# Patient Record
Sex: MALE | Race: WHITE | Employment: OTHER | URBAN - METROPOLITAN AREA
[De-identification: names, ages, dates, MRNs, and addresses within clinical notes are randomized per-mention and may not be internally consistent; named-entity substitution may affect disease eponyms.]

---

## 2019-08-01 ENCOUNTER — HOSPITAL ENCOUNTER (EMERGENCY)
Facility: CLINIC | Age: 49
Discharge: HOME | End: 2019-08-01
Attending: NURSE PRACTITIONER | Admitting: NURSE PRACTITIONER

## 2019-08-01 VITALS
RESPIRATION RATE: 18 BRPM | DIASTOLIC BLOOD PRESSURE: 82 MMHG | TEMPERATURE: 97.7 F | WEIGHT: 170 LBS | BODY MASS INDEX: 21.82 KG/M2 | OXYGEN SATURATION: 96 % | SYSTOLIC BLOOD PRESSURE: 120 MMHG | HEIGHT: 74 IN

## 2019-08-01 DIAGNOSIS — T40.1X1A: ICD-10-CM

## 2019-08-01 DIAGNOSIS — F11.20 HEROIN ADDICTION (H): ICD-10-CM

## 2019-08-01 PROCEDURE — 99283 EMERGENCY DEPT VISIT LOW MDM: CPT

## 2019-08-01 ASSESSMENT — ENCOUNTER SYMPTOMS
FEVER: 0
SHORTNESS OF BREATH: 1

## 2019-08-01 ASSESSMENT — MIFFLIN-ST. JEOR: SCORE: 1705.86

## 2019-08-01 NOTE — ED PROVIDER NOTES
"  History     Chief Complaint:  Drug Overdose    HPI   Agustín Gomez is a 48 year old male who presents via EMS for a drug overdose. The patient reports that he injected heroin into his leg at around 0930 this morning. The patient's friends saw him inject the heroin and watched him become unconscious and called EMS. EMS arrived and gave the patient 2mg  narcan around 20 minutes prior to arrival here. The patient states that he was feeling fine and did not want to come in however he began feeling tired and he then wanted to come in to be monitored. The patient states that he has been using heroin since 2005 and prior to today the last time he used was a few days ago. The patient has no interest in treatment. The patient was getting nebulization on arrival from EMS, he has COPD.     Allergies:  No known drug allergies.    Medications:    The patient is not currently taking any prescribed medications.    Past Medical History:    COPD    Past Surgical History:    History reviewed. No pertinent past surgical history.    Family History:    History reviewed. No pertinent family history.    Social History:  Presents to ED via EMS  PCP: No primary care provider on file.     Review of Systems   Constitutional: Negative for fever.   Respiratory: Positive for shortness of breath.    All other systems reviewed and are negative.    Physical Exam   First Vitals:  Patient Vitals for the past 24 hrs:   BP Temp Temp src Heart Rate Resp SpO2 Height Weight   08/01/19 1139 -- -- -- -- -- 96 % -- --   08/01/19 1045 -- -- -- -- -- 93 % -- --   08/01/19 1030 -- -- -- -- -- 91 % -- --   08/01/19 1016 120/82 97.7  F (36.5  C) Oral 99 18 97 % 1.88 m (6' 2\") 77.1 kg (170 lb)     Physical Exam  Physical Exam   Constitutional:  Awake, alert, and able to maintain conversation.   Head: Head moves freely with normal range of motion.   ENT: Oropharynx is clear and moist. Several missing teeth and overall teeth in poor repair.  Eyes: Conjunctivae " pink. EOMs intact.   Neck: Normal range of motion.   Cardiovascular: Regular rate and rhythm. Normal heart sounds. No concerning murmur. Intact distal pulses: radial pulses 2+ on the right, 2+ on the left.   Pulmonary/Chest: No decreased respirations. No respiratory distress. No decreased breath sounds. No wheezes. No rhonchi. No rales. Lungs clear throughout.   Abdominal: Soft. Non-tender. No rebound, no guarding.   Musculoskeletal: No peripheral edema. Distal capillary refill and sensation intact.   Neurological: Oriented to person, place, and time. No focal deficits.   Skin: Skin is warm and normal in color. No rash noted. Several scars over arms and legs likely from skin picking.    Emergency Department Course     Emergency Department Course:  10:13 AM Nursing notes and vitals reviewed.  I performed an exam of the patient as documented above.     11:37 AM Findings and plan explained to the patient. Patient discharged home with instructions regarding supportive care, medications, and reasons to return. The importance of close follow-up was reviewed.     Impression & Plan      Medical Decision Making:  Agustín Gomez is a 48 year old male with heroin abuse since 2005. Today he injected heroin and his friends watched him become unconscious shortly after injecting himself. They called 911 and EMS gave 2mg narcan. He has been awake and alert since narcan. We monitored him here just short of 2 hours at which time he wanted to leave and left prior to formal discharge. He maintained normal oxygen saturation here. He does not wish to speak to anyone about treatment and he refuses any IV or lab work which I do not feel is warranted at this time.       Diagnosis:    ICD-10-CM    1. Heroin overdose (H) T40.1X1A    2. Heroin addiction (H) F11.20        Disposition:  discharged to home    I, Bradley Aasen, am serving as a scribe on 8/1/2019 at 10:13 AM to personally document services performed by Rachel Mathew NP  based on my observations and the provider's statements to me.        Rachel Mathew, APRN [facility]  08/01/19 1957

## 2019-08-01 NOTE — ED NOTES
Bed: ED22  Expected date:   Expected time:   Means of arrival:   Comments:  E2  14 M od heroin/narcan/nebulizer for copd??  1012

## 2019-08-01 NOTE — ED TRIAGE NOTES
"Patient reports he took \"a little too much heroin.\" His friends witnessed him lose consciousness and called 911. Patient was given 2mg NARCAN on scene and woke up. He refused transport at first and then agreed to be transported so he could have a nebulizer to help with his breathing.   "

## 2019-10-11 ENCOUNTER — HOSPITAL ENCOUNTER (OUTPATIENT)
Facility: CLINIC | Age: 49
Setting detail: OBSERVATION
Discharge: HOME | End: 2019-10-13
Attending: EMERGENCY MEDICINE | Admitting: HOSPITALIST

## 2019-10-11 ENCOUNTER — APPOINTMENT (OUTPATIENT)
Dept: GENERAL RADIOLOGY | Facility: CLINIC | Age: 49
End: 2019-10-11
Attending: EMERGENCY MEDICINE

## 2019-10-11 DIAGNOSIS — J44.1 COPD WITH ACUTE EXACERBATION (H): Primary | ICD-10-CM

## 2019-10-11 DIAGNOSIS — J44.1 COPD EXACERBATION (H): ICD-10-CM

## 2019-10-11 LAB
ANION GAP SERPL CALCULATED.3IONS-SCNC: 6 MMOL/L (ref 3–14)
BASOPHILS # BLD AUTO: 0 10E9/L (ref 0–0.2)
BASOPHILS NFR BLD AUTO: 0.2 %
BUN SERPL-MCNC: 9 MG/DL (ref 7–30)
CALCIUM SERPL-MCNC: 7.4 MG/DL (ref 8.5–10.1)
CHLORIDE SERPL-SCNC: 113 MMOL/L (ref 94–109)
CO2 SERPL-SCNC: 22 MMOL/L (ref 20–32)
CREAT SERPL-MCNC: 0.64 MG/DL (ref 0.66–1.25)
DIFFERENTIAL METHOD BLD: NORMAL
EOSINOPHIL # BLD AUTO: 0.1 10E9/L (ref 0–0.7)
EOSINOPHIL NFR BLD AUTO: 0.6 %
ERYTHROCYTE [DISTWIDTH] IN BLOOD BY AUTOMATED COUNT: 12.8 % (ref 10–15)
GFR SERPL CREATININE-BSD FRML MDRD: >90 ML/MIN/{1.73_M2}
GLUCOSE SERPL-MCNC: 99 MG/DL (ref 70–99)
HCT VFR BLD AUTO: 45.6 % (ref 40–53)
HGB BLD-MCNC: 15.9 G/DL (ref 13.3–17.7)
IMM GRANULOCYTES # BLD: 0 10E9/L (ref 0–0.4)
IMM GRANULOCYTES NFR BLD: 0.2 %
INTERPRETATION ECG - MUSE: NORMAL
LYMPHOCYTES # BLD AUTO: 2.1 10E9/L (ref 0.8–5.3)
LYMPHOCYTES NFR BLD AUTO: 20.5 %
MCH RBC QN AUTO: 30.8 PG (ref 26.5–33)
MCHC RBC AUTO-ENTMCNC: 34.9 G/DL (ref 31.5–36.5)
MCV RBC AUTO: 88 FL (ref 78–100)
MONOCYTES # BLD AUTO: 0.9 10E9/L (ref 0–1.3)
MONOCYTES NFR BLD AUTO: 9.2 %
NEUTROPHILS # BLD AUTO: 7 10E9/L (ref 1.6–8.3)
NEUTROPHILS NFR BLD AUTO: 69.3 %
NRBC # BLD AUTO: 0 10*3/UL
NRBC BLD AUTO-RTO: 0 /100
PLATELET # BLD AUTO: 268 10E9/L (ref 150–450)
POTASSIUM SERPL-SCNC: 3.1 MMOL/L (ref 3.4–5.3)
RBC # BLD AUTO: 5.17 10E12/L (ref 4.4–5.9)
SODIUM SERPL-SCNC: 141 MMOL/L (ref 133–144)
TROPONIN I SERPL-MCNC: <0.015 UG/L (ref 0–0.04)
WBC # BLD AUTO: 10.1 10E9/L (ref 4–11)

## 2019-10-11 PROCEDURE — 85025 COMPLETE CBC W/AUTO DIFF WBC: CPT | Performed by: EMERGENCY MEDICINE

## 2019-10-11 PROCEDURE — 96375 TX/PRO/DX INJ NEW DRUG ADDON: CPT | Performed by: HOSPITALIST

## 2019-10-11 PROCEDURE — G0378 HOSPITAL OBSERVATION PER HR: HCPCS

## 2019-10-11 PROCEDURE — 25000125 ZZHC RX 250

## 2019-10-11 PROCEDURE — 25000132 ZZH RX MED GY IP 250 OP 250 PS 637: Performed by: HOSPITALIST

## 2019-10-11 PROCEDURE — 94640 AIRWAY INHALATION TREATMENT: CPT

## 2019-10-11 PROCEDURE — 84484 ASSAY OF TROPONIN QUANT: CPT | Performed by: EMERGENCY MEDICINE

## 2019-10-11 PROCEDURE — 80048 BASIC METABOLIC PNL TOTAL CA: CPT | Performed by: EMERGENCY MEDICINE

## 2019-10-11 PROCEDURE — 25000128 H RX IP 250 OP 636: Performed by: HOSPITALIST

## 2019-10-11 PROCEDURE — 25000125 ZZHC RX 250: Performed by: EMERGENCY MEDICINE

## 2019-10-11 PROCEDURE — 96365 THER/PROPH/DIAG IV INF INIT: CPT

## 2019-10-11 PROCEDURE — 25000128 H RX IP 250 OP 636: Performed by: EMERGENCY MEDICINE

## 2019-10-11 PROCEDURE — 71046 X-RAY EXAM CHEST 2 VIEWS: CPT

## 2019-10-11 PROCEDURE — 99219 ZZC INITIAL OBSERVATION CARE,LEVL II: CPT | Performed by: HOSPITALIST

## 2019-10-11 PROCEDURE — 93005 ELECTROCARDIOGRAM TRACING: CPT

## 2019-10-11 PROCEDURE — 25000128 H RX IP 250 OP 636

## 2019-10-11 PROCEDURE — 96375 TX/PRO/DX INJ NEW DRUG ADDON: CPT

## 2019-10-11 PROCEDURE — 99285 EMERGENCY DEPT VISIT HI MDM: CPT | Mod: 25

## 2019-10-11 RX ORDER — ACETAMINOPHEN 325 MG/1
650 TABLET ORAL EVERY 4 HOURS PRN
Status: DISCONTINUED | OUTPATIENT
Start: 2019-10-11 | End: 2019-10-13 | Stop reason: HOSPADM

## 2019-10-11 RX ORDER — ONDANSETRON 4 MG/1
4 TABLET, ORALLY DISINTEGRATING ORAL EVERY 6 HOURS PRN
Status: DISCONTINUED | OUTPATIENT
Start: 2019-10-11 | End: 2019-10-13 | Stop reason: HOSPADM

## 2019-10-11 RX ORDER — ACETAMINOPHEN 650 MG/1
650 SUPPOSITORY RECTAL EVERY 4 HOURS PRN
Status: DISCONTINUED | OUTPATIENT
Start: 2019-10-11 | End: 2019-10-13 | Stop reason: HOSPADM

## 2019-10-11 RX ORDER — IPRATROPIUM BROMIDE AND ALBUTEROL SULFATE 2.5; .5 MG/3ML; MG/3ML
SOLUTION RESPIRATORY (INHALATION)
Status: DISCONTINUED
Start: 2019-10-11 | End: 2019-10-11 | Stop reason: HOSPADM

## 2019-10-11 RX ORDER — ALBUTEROL SULFATE 0.83 MG/ML
3 SOLUTION RESPIRATORY (INHALATION)
Status: DISCONTINUED | OUTPATIENT
Start: 2019-10-11 | End: 2019-10-13 | Stop reason: HOSPADM

## 2019-10-11 RX ORDER — IPRATROPIUM BROMIDE AND ALBUTEROL SULFATE 2.5; .5 MG/3ML; MG/3ML
3 SOLUTION RESPIRATORY (INHALATION) ONCE
Status: COMPLETED | OUTPATIENT
Start: 2019-10-11 | End: 2019-10-11

## 2019-10-11 RX ORDER — LIDOCAINE 40 MG/G
CREAM TOPICAL
Status: DISCONTINUED | OUTPATIENT
Start: 2019-10-11 | End: 2019-10-13 | Stop reason: HOSPADM

## 2019-10-11 RX ORDER — MAGNESIUM SULFATE HEPTAHYDRATE 500 MG/ML
2 INJECTION, SOLUTION INTRAMUSCULAR; INTRAVENOUS ONCE
Status: DISCONTINUED | OUTPATIENT
Start: 2019-10-11 | End: 2019-10-11

## 2019-10-11 RX ORDER — POTASSIUM CHLORIDE 1500 MG/1
40 TABLET, EXTENDED RELEASE ORAL ONCE
Status: COMPLETED | OUTPATIENT
Start: 2019-10-11 | End: 2019-10-11

## 2019-10-11 RX ORDER — PREDNISONE 20 MG/1
40 TABLET ORAL DAILY
Status: DISCONTINUED | OUTPATIENT
Start: 2019-10-12 | End: 2019-10-13 | Stop reason: HOSPADM

## 2019-10-11 RX ORDER — IPRATROPIUM BROMIDE AND ALBUTEROL SULFATE 2.5; .5 MG/3ML; MG/3ML
3 SOLUTION RESPIRATORY (INHALATION) EVERY 4 HOURS
Status: DISCONTINUED | OUTPATIENT
Start: 2019-10-12 | End: 2019-10-13 | Stop reason: HOSPADM

## 2019-10-11 RX ORDER — ALBUTEROL SULFATE 90 UG/1
2 AEROSOL, METERED RESPIRATORY (INHALATION) EVERY 6 HOURS
Status: ON HOLD | COMMUNITY
End: 2019-10-13

## 2019-10-11 RX ORDER — ALBUTEROL SULFATE 90 UG/1
2 AEROSOL, METERED RESPIRATORY (INHALATION) EVERY 6 HOURS PRN
Status: DISCONTINUED | OUTPATIENT
Start: 2019-10-11 | End: 2019-10-13 | Stop reason: HOSPADM

## 2019-10-11 RX ORDER — LORAZEPAM 2 MG/ML
0.5 INJECTION INTRAMUSCULAR
Status: DISCONTINUED | OUTPATIENT
Start: 2019-10-11 | End: 2019-10-11

## 2019-10-11 RX ORDER — NALOXONE HYDROCHLORIDE 0.4 MG/ML
.1-.4 INJECTION, SOLUTION INTRAMUSCULAR; INTRAVENOUS; SUBCUTANEOUS
Status: DISCONTINUED | OUTPATIENT
Start: 2019-10-11 | End: 2019-10-13 | Stop reason: HOSPADM

## 2019-10-11 RX ORDER — MAGNESIUM SULFATE HEPTAHYDRATE 40 MG/ML
2 INJECTION, SOLUTION INTRAVENOUS ONCE
Status: COMPLETED | OUTPATIENT
Start: 2019-10-11 | End: 2019-10-11

## 2019-10-11 RX ORDER — ONDANSETRON 2 MG/ML
4 INJECTION INTRAMUSCULAR; INTRAVENOUS EVERY 6 HOURS PRN
Status: DISCONTINUED | OUTPATIENT
Start: 2019-10-11 | End: 2019-10-13 | Stop reason: HOSPADM

## 2019-10-11 RX ORDER — IPRATROPIUM BROMIDE AND ALBUTEROL SULFATE 2.5; .5 MG/3ML; MG/3ML
SOLUTION RESPIRATORY (INHALATION)
Status: COMPLETED
Start: 2019-10-11 | End: 2019-10-11

## 2019-10-11 RX ORDER — LORAZEPAM 2 MG/ML
INJECTION INTRAMUSCULAR
Status: COMPLETED
Start: 2019-10-11 | End: 2019-10-11

## 2019-10-11 RX ORDER — METHYLPREDNISOLONE SODIUM SUCCINATE 125 MG/2ML
125 INJECTION, POWDER, LYOPHILIZED, FOR SOLUTION INTRAMUSCULAR; INTRAVENOUS ONCE
Status: COMPLETED | OUTPATIENT
Start: 2019-10-11 | End: 2019-10-11

## 2019-10-11 RX ADMIN — LORAZEPAM 0.5 MG: 2 INJECTION INTRAMUSCULAR at 19:55

## 2019-10-11 RX ADMIN — IPRATROPIUM BROMIDE AND ALBUTEROL SULFATE 3 ML: .5; 3 SOLUTION RESPIRATORY (INHALATION) at 19:57

## 2019-10-11 RX ADMIN — POTASSIUM CHLORIDE 40 MEQ: 1500 TABLET, EXTENDED RELEASE ORAL at 22:46

## 2019-10-11 RX ADMIN — METHYLPREDNISOLONE SODIUM SUCCINATE 125 MG: 125 INJECTION, POWDER, FOR SOLUTION INTRAMUSCULAR; INTRAVENOUS at 17:34

## 2019-10-11 RX ADMIN — IPRATROPIUM BROMIDE AND ALBUTEROL SULFATE 3 ML: .5; 3 SOLUTION RESPIRATORY (INHALATION) at 16:58

## 2019-10-11 RX ADMIN — IPRATROPIUM BROMIDE AND ALBUTEROL SULFATE 3 ML: 2.5; .5 SOLUTION RESPIRATORY (INHALATION) at 16:58

## 2019-10-11 RX ADMIN — MAGNESIUM SULFATE HEPTAHYDRATE 2 G: 40 INJECTION, SOLUTION INTRAVENOUS at 19:40

## 2019-10-11 RX ADMIN — HYDROMORPHONE HYDROCHLORIDE 1 MG: 1 INJECTION, SOLUTION INTRAMUSCULAR; INTRAVENOUS; SUBCUTANEOUS at 22:43

## 2019-10-11 RX ADMIN — LORAZEPAM 0.5 MG: 2 INJECTION INTRAMUSCULAR; INTRAVENOUS at 19:55

## 2019-10-11 ASSESSMENT — ENCOUNTER SYMPTOMS
SHORTNESS OF BREATH: 1
WHEEZING: 1

## 2019-10-11 ASSESSMENT — MIFFLIN-ST. JEOR: SCORE: 1719.75

## 2019-10-11 NOTE — LETTER
Transition Communication Hand-off for Care Transitions to Next Level of Care Provider    Name: Agsutín Gomez  : 1970  MRN #: [id number]  Primary Care Provider: Jonny Weathers  Primary Care MD Name: Dr. Weathers  Primary Clinic: [address] [address]  Primary Care Clinic Name: est PCP at [facility]  Reason for Hospitalization:  COPD exacerbation (H) [J44.1]  Admit Date/Time: 10/11/2019  4:36 PM  Discharge Date: ,td    Payor Source: No coverage found.      Readmission Assessment Measure (TIMOTHY) Risk Score/category: unknown             Reason for Communication Hand-off Referral: Admission diagnoses: COPD  Other no insurance and establishing new PCP      Future Appointments   Date Time Provider Department Center   10/22/2019  2:00 PM Jonny Weathers MD [facility]           Key Recommendations:  Patient admitted observation with COPD exacerbation.  Patient has no insurance, no primary PCP, no job and no money.  Set up patient to with PCP at [facility] and informed him of possible  Co-pay for office visit and that if he attended this appointment he could met with a SW that could assist him in applying for MA.  Patient stated he would attend PCP appointment.  [facility] Funding used to cover the cost of patient's medication-pred. & 2 inhalers.     Thank you for seeing this patient if he attends his PCP appointment,   Zehra Roblero RN    AVS/Discharge Summary is the source of truth; this is a helpful guide for improved communication of patient story

## 2019-10-11 NOTE — ED PROVIDER NOTES
History     Chief Complaint:    Shortness of Breath     HPI   Agustín Gomez is a 49 year old male, with a history of COPD, who presents to the ED for evaluation of shortness of breath. The patient reports that she has been feeling short of breath and wheezy for the past two weeks. He was seen at [facility] last week and given nebulizer treatments with some improvement in his symptoms and ultimately discharged to home. He also complains of a productive cough. Given his persistent symptoms and history of hospital admissions for related symptoms, he presented to the ED for further evaluation.      Allergies:  The patient has no known drug allergies.    Medications:    No current outpatient medications on file.     Past Medical History:    COPD    Past Surgical History:    No past surgical history on file.    Family History:    No family history on file.    Social History:  Current every day smoker.  Positive for alcohol use.   Hx of IV drug use, opiate use.     Review of Systems   Respiratory: Positive for shortness of breath and wheezing.    All other systems reviewed and are negative.        Physical Exam   First Vitals:  BP: (!) 126/115  Pulse: 92  Heart Rate: 88  Temp: 99.1  F (37.3  C)  Resp: 20  SpO2: 99 %      Physical Exam  GENERAL: well developed, pleasant  HEAD: atraumatic  EYES: pupils reactive, extraocular muscles intact, conjunctivae normal  ENT:  mucus membranes moist  NECK:  trachea midline, normal range of motion  RESPIRATORY: tachypneic with wheezing, looks dyspenic  CVS: normal S1/S2, no murmurs, intact distal pulses  ABDOMEN: soft, nontender, nondistention  MUSCULOSKELETAL: no deformities  SKIN: warm and dry, no acute rashes or ulceration  NEURO: GCS 15, cranial nerves intact, alert and oriented x3  PSYCH:  Mood/affect normal    Emergency Department Course   EKG:  Indication: SOB  Time: 1646  Vent. Rate 92 bpm. KS interval 116. QRS duration 80. QT/QTc 358/442. P-R-T axis 81 66 78.  Normal sinus rhythm.  Normal ECG. Read time: 1650    Imaging:  Radiographic findings were communicated with the patient who voiced understanding of the findings.  XR Chest 2 views:   IMPRESSION: No acute cardiopulmonary disease. Hyperinflated lungs with  flattening of the hemidiaphragms in keeping with emphysema. Mild  subsegmental atelectasis in the left lower lung. Multilevel  degenerative changes seen in the spine as per radiology.    Laboratory:  CBC: WBC: 10.1, HGB: 15.9, PLT: 268  BMP: Potassium: 3.1 (L), Chloride: 113 (H), Creatinine: 0.64 (L), Calcium: 7.4 (L), o/w WNL     1803 Troponin: <0.015  Drug screen: pending    Interventions:  1658 Duoneb 3 mL Nebulization   1734 Solu-medrol 125 mg IV  1940 Magnesium 2 g IV  1955 Ativan 0.5 mg IV  1957 Duoneb 3 mL Nebulization       Emergency Department Course:  Nursing notes and vitals reviewed. (1650) I performed an exam of the patient as documented above.     IV inserted. Medicine administered as documented above. Blood drawn. This was sent to the lab for further testing, results above.     The patient was sent for a chest x-ray while in the emergency department, findings above.     2000 I rechecked the patient and discussed the results of his workup thus far.     2007  I consulted with Dr. Greene of the hospitalist services. They are in agreement to accept the patient for admission.    Findings and plan explained to the Patient who consents to admission. Discussed the patient with Dr. Greene, who will admit the patient to a obs bed for further monitoring, evaluation, and treatment.    Impression & Plan    Medical Decision Making:  Patient presents with increased work of breathing over the last 2 weeks.  He notes he was at [facility] and they sent him home and he notes he typically gets admitted with steroids and inhalers.  Patient has wheezing bilaterally and looks mildly anxious.  He was given duo nebs and Solu-Medrol and magnesium.  After the magnesium he complained of feeling hot and more  anxious appearing.  He was given Ativan.  Spoke with the hospitalist regarding admission given his ongoing wheezing.      Diagnosis:    ICD-10-CM    1. COPD exacerbation (H) J44.1        Disposition:  Admitted to Dr. Jc Plata Disclosure:  I,  Damien Ellis, am serving as a scribe on 10/11/2019 at 4:58 PM to personally document services performed by Shakir Lewis MD based on my observations and the provider's statements to me.        Damien Ellis  10/11/2019    EMERGENCY DEPARTMENT       Shakir Lewis MD  10/11/19 [id number]

## 2019-10-12 LAB
AMPHETAMINES UR QL SCN: NEGATIVE
ANION GAP SERPL CALCULATED.3IONS-SCNC: 4 MMOL/L (ref 3–14)
BARBITURATES UR QL: NEGATIVE
BENZODIAZ UR QL: NEGATIVE
BUN SERPL-MCNC: 15 MG/DL (ref 7–30)
CALCIUM SERPL-MCNC: 9.2 MG/DL (ref 8.5–10.1)
CANNABINOIDS UR QL SCN: NEGATIVE
CHLORIDE SERPL-SCNC: 108 MMOL/L (ref 94–109)
CK SERPL-CCNC: 66 U/L (ref 30–300)
CO2 SERPL-SCNC: 24 MMOL/L (ref 20–32)
COCAINE UR QL: POSITIVE
CREAT SERPL-MCNC: 0.72 MG/DL (ref 0.66–1.25)
GFR SERPL CREATININE-BSD FRML MDRD: >90 ML/MIN/{1.73_M2}
GLUCOSE SERPL-MCNC: 159 MG/DL (ref 70–99)
OPIATES UR QL SCN: POSITIVE
PCP UR QL SCN: NEGATIVE
POTASSIUM SERPL-SCNC: 4.6 MMOL/L (ref 3.4–5.3)
SODIUM SERPL-SCNC: 136 MMOL/L (ref 133–144)

## 2019-10-12 PROCEDURE — 96376 TX/PRO/DX INJ SAME DRUG ADON: CPT | Performed by: HOSPITALIST

## 2019-10-12 PROCEDURE — 40000275 ZZH STATISTIC RCP TIME EA 10 MIN: Mod: 76

## 2019-10-12 PROCEDURE — 90682 RIV4 VACC RECOMBINANT DNA IM: CPT | Performed by: HOSPITALIST

## 2019-10-12 PROCEDURE — G0378 HOSPITAL OBSERVATION PER HR: HCPCS

## 2019-10-12 PROCEDURE — 25000125 ZZHC RX 250: Performed by: HOSPITALIST

## 2019-10-12 PROCEDURE — 94640 AIRWAY INHALATION TREATMENT: CPT | Mod: 76

## 2019-10-12 PROCEDURE — 25000132 ZZH RX MED GY IP 250 OP 250 PS 637: Performed by: HOSPITALIST

## 2019-10-12 PROCEDURE — 36415 COLL VENOUS BLD VENIPUNCTURE: CPT | Performed by: HOSPITALIST

## 2019-10-12 PROCEDURE — 96375 TX/PRO/DX INJ NEW DRUG ADDON: CPT | Performed by: HOSPITALIST

## 2019-10-12 PROCEDURE — 25000131 ZZH RX MED GY IP 250 OP 636 PS 637: Performed by: HOSPITALIST

## 2019-10-12 PROCEDURE — 80048 BASIC METABOLIC PNL TOTAL CA: CPT | Performed by: HOSPITALIST

## 2019-10-12 PROCEDURE — 80307 DRUG TEST PRSMV CHEM ANLYZR: CPT | Performed by: EMERGENCY MEDICINE

## 2019-10-12 PROCEDURE — 25000128 H RX IP 250 OP 636: Performed by: HOSPITALIST

## 2019-10-12 PROCEDURE — 99225 ZZC SUBSEQUENT OBSERVATION CARE,LEVEL II: CPT | Performed by: HOSPITALIST

## 2019-10-12 PROCEDURE — 94640 AIRWAY INHALATION TREATMENT: CPT

## 2019-10-12 PROCEDURE — 82550 ASSAY OF CK (CPK): CPT | Performed by: HOSPITALIST

## 2019-10-12 RX ORDER — IBUPROFEN 600 MG/1
600 TABLET, FILM COATED ORAL EVERY 6 HOURS PRN
Status: DISCONTINUED | OUTPATIENT
Start: 2019-10-12 | End: 2019-10-13 | Stop reason: HOSPADM

## 2019-10-12 RX ORDER — KETOROLAC TROMETHAMINE 30 MG/ML
30 INJECTION, SOLUTION INTRAMUSCULAR; INTRAVENOUS ONCE
Status: COMPLETED | OUTPATIENT
Start: 2019-10-12 | End: 2019-10-12

## 2019-10-12 RX ADMIN — HYDROMORPHONE HYDROCHLORIDE 1 MG: 1 INJECTION, SOLUTION INTRAMUSCULAR; INTRAVENOUS; SUBCUTANEOUS at 21:14

## 2019-10-12 RX ADMIN — HYDROMORPHONE HYDROCHLORIDE 1 MG: 2 TABLET ORAL at 03:15

## 2019-10-12 RX ADMIN — ACETAMINOPHEN 650 MG: 325 TABLET, FILM COATED ORAL at 16:50

## 2019-10-12 RX ADMIN — IPRATROPIUM BROMIDE AND ALBUTEROL SULFATE 3 ML: .5; 3 SOLUTION RESPIRATORY (INHALATION) at 23:05

## 2019-10-12 RX ADMIN — IBUPROFEN 600 MG: 600 TABLET ORAL at 15:50

## 2019-10-12 RX ADMIN — KETOROLAC TROMETHAMINE 30 MG: 30 INJECTION, SOLUTION INTRAMUSCULAR at 20:16

## 2019-10-12 RX ADMIN — HYDROMORPHONE HYDROCHLORIDE 1 MG: 2 TABLET ORAL at 09:22

## 2019-10-12 RX ADMIN — IPRATROPIUM BROMIDE AND ALBUTEROL SULFATE 3 ML: .5; 3 SOLUTION RESPIRATORY (INHALATION) at 16:19

## 2019-10-12 RX ADMIN — ACETAMINOPHEN 650 MG: 325 TABLET, FILM COATED ORAL at 03:17

## 2019-10-12 RX ADMIN — ACETAMINOPHEN 650 MG: 325 TABLET, FILM COATED ORAL at 09:22

## 2019-10-12 RX ADMIN — IPRATROPIUM BROMIDE AND ALBUTEROL SULFATE 3 ML: .5; 3 SOLUTION RESPIRATORY (INHALATION) at 20:15

## 2019-10-12 RX ADMIN — INFLUENZA A VIRUS A/BRISBANE/02/2018 (H1N1) RECOMBINANT HEMAGGLUTININ ANTIGEN, INFLUENZA A VIRUS A/KANSAS/14/2017 (H3N2) RECOMBINANT HEMAGGLUTININ ANTIGEN, INFLUENZA B VIRUS B/PHUKET/3073/2013 RECOMBINANT HEMAGGLUTININ ANTIGEN, AND INFLUENZA B VIRUS B/MARYLAND/15/2016 RECOMBINANT HEMAGGLUTININ ANTIGEN 0.5 ML: 45; 45; 45; 45 INJECTION INTRAMUSCULAR at 13:43

## 2019-10-12 RX ADMIN — IPRATROPIUM BROMIDE AND ALBUTEROL SULFATE 3 ML: .5; 3 SOLUTION RESPIRATORY (INHALATION) at 07:40

## 2019-10-12 RX ADMIN — PREDNISONE 40 MG: 20 TABLET ORAL at 09:22

## 2019-10-12 RX ADMIN — IPRATROPIUM BROMIDE AND ALBUTEROL SULFATE 3 ML: .5; 3 SOLUTION RESPIRATORY (INHALATION) at 12:39

## 2019-10-12 NOTE — PLAN OF CARE
RECEIVING UNIT ED HANDOFF REVIEW    ED Nurse Handoff Report was reviewed by: Clarice Mayorga RN on October 11, 2019 at 9:14 PM

## 2019-10-12 NOTE — PLAN OF CARE
DATE & TIME: 10/11 7P-7A                          Cognitive Concerns/ Orientation : Alert and oriented   BEHAVIOR & AGGRESSION TOOL COLOR: Green, anxious at times             ABNL VS/O2: VSS on room air  MOBILITY: Independent  PAIN MANAGMENT: Dilaudid IV once, PO once, tylenol once  DIET: Regular  BOWEL/BLADDER: Continent,  ABNL LAB/BG: K 3.1 - One time PO replacement given, recheck in AM  DRAIN/DEVICES: IV SL  SKIN:    TESTS/PROCEDURES:  SW and care coordinator consults  D/C DAY/GOALS/PLACE:  pending  OTHER IMPORTANT INFO:  OBS status. Expiratory wheezes auscultated throughout. DIAS noted. Urine positive for cocaine and opiates

## 2019-10-12 NOTE — PROGRESS NOTES
[facility]    Medicine Progress Note - Hospitalist Service       Date of Admission:  10/11/2019  Assessment & Plan   Agustín Gomez is a 49 year old male with a history of COPD who presented to the ER today for evaluation of shortness of breath.  He was felt to have a COPD exacerbation and is being brought into the hospital for observation.     Acute COPD exacerbation  No obvious signs of bacterial infection at this point, will hold off on antibiotics.  - continue prednisone daily  - Duo nebs every 4 hours.  - PRN albuterol nebulizers available.  - Respiratory therapy consult.  - Would benefit from seeing social work, states he is unable to afford any medications as an outpatient and is therefore not taking any medications as an outpatient.    Chronic bilateral lower extremity pain  Reports chronic pain in bilateral lower extremities from the knee down, encompassing entire lower extremity.  - no further narcotics for chronic pain  - continue prn tylenol  - add prn ibuprofen  - check CK, but denies any muscle tenderness  - pulses intact with good cap refill and warm distal extremities, do not suspect malperfusion     Hypokalemia  - K protocol  - K pending this AM     Polysubstance abuse  Admits to using cocaine and heroin, however states that he quit for 5 days ago (Utox positive for cocaine and opiates).  - He is not interested in discussing this any further or seeking any treatment.  - no narcotics           Diet: Regular Diet Adult    DVT Prophylaxis: Low Risk/Ambulatory with no VTE prophylaxis indicated  Love Catheter: not present  Code Status: Full Code      Disposition Plan   Expected discharge: Tomorrow, recommended to prior living arrangement once dyspnea improved.  Entered: Ke Pedersen MD 10/12/2019, 10:33 AM       The patient's care was discussed with the Bedside Nurse and Patient.    Ke Pedersen MD  Hospitalist Service  [facility]ospital    ______________________________________________________________________    Interval History   Reports wheezing and dyspnea have improved, but reports ongoing dyspnea at rest.  Cough unchanged and non-productive.  No fever/chills.  Complains of bilateral lower extremity pain which is chronic.    Data reviewed today: I reviewed all medications, new labs and imaging results over the last 24 hours. I personally reviewed no images or EKG's today.    Physical Exam   Vital Signs: Temp: 97.6  F (36.4  C) Temp src: Oral BP: 133/81 Pulse: 83 Heart Rate: 67 Resp: 22 SpO2: 96 % O2 Device: None (Room air)    Weight: 174 lbs 2.61 oz  General Appearance: Well developed, well nourished male in NAD  Respiratory: moderate diffuse inspiratory and expiratory wheezing, no tachypnea or distress  Cardiovascular: RRR, normal s1/s2 without murmur  GI: abdomen soft, normal bowel sounds, nontender  Skin: numerous hyperpigmented lesions of bilateral lower extremities which he reports are due to prior abscesses from injection sites  Other: Alert and appropriate, cranial nerves grossly intact     Data   Recent Labs   Lab 10/11/19  1803 10/11/19  1730   WBC  --  10.1   HGB  --  15.9   MCV  --  88   PLT  --  268     --    POTASSIUM 3.1*  --    CHLORIDE 113*  --    CO2 22  --    BUN 9  --    CR 0.64*  --    ANIONGAP 6  --    JUAN FRANCISCO 7.4*  --    GLC 99  --    TROPI <0.015  --

## 2019-10-12 NOTE — ED NOTES
"[facility]  ED Nurse Handoff Report    ED Chief complaint: Shortness of Breath (copd exarcebation, got worse last night.  Was at [facility] last week)      ED Diagnosis:   Final diagnoses:   COPD exacerbation (H)       Code Status: Full Code    Allergies: No Known Allergies    Activity level - Baseline/Home:  Independent  Activity Level - Current:   Independent    Patient's Preferred language:  English   Needed?: No    Isolation: No  Infection: Not Applicable  Bariatric?: No    Vital Signs:   Vitals:    10/11/19 1653 10/11/19 1700 10/11/19 1730 10/11/19 2000   BP:    139/78   Pulse:    83   Resp: 20 22 (!) 44 25   Temp: 99.1  F (37.3  C)      TempSrc: Temporal      SpO2:  99% 98% 99%       Cardiac Rhythm: ,        Pain level:      Is this patient confused?: No   Does this patient have a guardian?  No         If yes, is there guardianship documents in the Epic \"Code/ACP\" activity?  N/A         Guardian Notified?  N/A  [place] - Suicide Severity Rating Scale Completed?  Yes  If yes, what color did the patient score?  White    Patient Report: Initial Complaint:  SOB   Focused Assessment:  SOB, patient states COPD.  States usually when he is this bad he gets admitted.  Wheezing.  Labored breathing initially.  Improvement with medications & 1 dose ativan.   Tests Performed:  Labs, Chest XR.   Abnormal Results:   Results for orders placed or performed during the hospital encounter of 10/11/19   XR Chest 2 Views    Narrative    XR CHEST TWO VIEWS   10/11/2019 5:48 PM     HISTORY: Shortness of breath, cough, chronic obstructive pulmonary  disease.      COMPARISON: None available      Impression    IMPRESSION: No acute cardiopulmonary disease. Hyperinflated lungs with  flattening of the hemidiaphragms in keeping with emphysema. Mild  subsegmental atelectasis in the left lower lung. Multilevel  degenerative changes seen in the spine.    KARINA HARDEN MD   CBC with platelets differential "   Result Value Ref Range    WBC 10.1 4.0 - 11.0 10e9/L    RBC Count 5.17 4.4 - 5.9 10e12/L    Hemoglobin 15.9 13.3 - 17.7 g/dL    Hematocrit 45.6 40.0 - 53.0 %    MCV 88 78 - 100 fl    MCH 30.8 26.5 - 33.0 pg    MCHC 34.9 31.5 - 36.5 g/dL    RDW 12.8 10.0 - 15.0 %    Platelet Count 268 150 - 450 10e9/L    Diff Method Automated Method     % Neutrophils 69.3 %    % Lymphocytes 20.5 %    % Monocytes 9.2 %    % Eosinophils 0.6 %    % Basophils 0.2 %    % Immature Granulocytes 0.2 %    Nucleated RBCs 0 0 /100    Absolute Neutrophil 7.0 1.6 - 8.3 10e9/L    Absolute Lymphocytes 2.1 0.8 - 5.3 10e9/L    Absolute Monocytes 0.9 0.0 - 1.3 10e9/L    Absolute Eosinophils 0.1 0.0 - 0.7 10e9/L    Absolute Basophils 0.0 0.0 - 0.2 10e9/L    Abs Immature Granulocytes 0.0 0 - 0.4 10e9/L    Absolute Nucleated RBC 0.0    Basic metabolic panel   Result Value Ref Range    Sodium 141 133 - 144 mmol/L    Potassium 3.1 (L) 3.4 - 5.3 mmol/L    Chloride 113 (H) 94 - 109 mmol/L    Carbon Dioxide 22 20 - 32 mmol/L    Anion Gap 6 3 - 14 mmol/L    Glucose 99 70 - 99 mg/dL    Urea Nitrogen 9 7 - 30 mg/dL    Creatinine 0.64 (L) 0.66 - 1.25 mg/dL    GFR Estimate >90 >60 mL/min/[1.73_m2]    GFR Estimate If Black >90 >60 mL/min/[1.73_m2]    Calcium 7.4 (L) 8.5 - 10.1 mg/dL   Troponin I   Result Value Ref Range    Troponin I ES <0.015 0.000 - 0.045 ug/L       Treatments provided:  IV insertion.  Medications given, see MAR.     Family Comments:  None present.     OBS brochure/video discussed/provided to patient/family: Yes              Name of person given brochure if not patient:               Relationship to patient:     ED Medications:   Medications   magnesium sulfate injection 2 g ( Intravenous Canceled Entry 10/11/19 1937)   LORazepam (ATIVAN) injection 0.5 mg (0.5 mg Intravenous Given 10/11/19 1955)   ipratropium - albuterol 0.5 mg/2.5 mg/3 mL (DUONEB) neb solution 3 mL (3 mLs Nebulization Canceled Entry 10/11/19 1955)   methylPREDNISolone sodium  succinate (solu-MEDROL) injection 125 mg (125 mg Intravenous Given 10/11/19 1734)   magnesium sulfate 2 g in water intermittent infusion (0 g Intravenous Stopped 10/11/19 1956)   ipratropium - albuterol 0.5 mg/2.5 mg/3 mL (DUONEB) neb solution 3 mL (3 mLs Nebulization Given 10/11/19 1957)       Drips infusing?:  No    For the majority of the shift this patient was Green.   Interventions performed were monitor, assess.    Severe Sepsis OR Septic Shock Diagnosis Present: No    To be done/followed up on inpatient unit:  continuum of care    ED NURSE PHONE NUMBER:  *[phone]

## 2019-10-12 NOTE — PLAN OF CARE
DATE & TIME: 10/12/19 7407-4191  Cognitive Concerns/ Orientation : A&O x4  BEHAVIOR & AGGRESSION TOOL COLOR: Green, anxious at times             ABNL VS/O2: VSS on RA  MOBILITY: Independent  PAIN MANAGMENT: C/O chronic lower leg pain and headache, Tylenol given x1, PO dilaudid x1 (now discontinued), Ibuprofen available.   DIET: Regular, tolerating   BOWEL/BLADDER: Continent, up to bathroom   ABNL LAB/BG: K recheck 4.6; Urine positive for cocaine and opiates  DRAIN/DEVICES: IV SL  SKIN:  Bruises, scabs/lesions to bilateral lower legs  TESTS/PROCEDURES:  n/a  D/C DAY/GOALS/PLACE:  Pending, likely home tomorrow   OTHER IMPORTANT INFO:  SW consulted. LS diminished/exp wheezes, DIAS.

## 2019-10-12 NOTE — PROVIDER NOTIFICATION
MD Notification    Notified Person: MD    Notified Person Name: Dr. Greene    Notification Date/Time: 10/11 at 2200    Notification Interaction: Spoke to MD    Purpose of Notification: Patient requesting IV dilaudid instead of PO    Orders Received: One time order of IV dilaudid entered by MD    Comments:

## 2019-10-12 NOTE — H&P
[facility]    History and Physical  Hospitalist       Date of Admission:  10/11/2019    Assessment & Plan   Agustín Gomez is a 49 year old male with a history of COPD who presented to the ER today for evaluation of shortness of breath.  He was felt to have a COPD exacerbation and is being brought into the hospital for observation.    Acute COPD exacerbation  - No obvious signs of bacterial infection at this point, will hold off on antibiotics.  - Received IV Solu-Medrol in the ER, will start oral prednisone in a.m.  - Duo nebs every 4 hours.  - PRN albuterol nebulizers available.  - Respiratory therapy consult.  - Would benefit from seeing social work, states he is unable to afford any medications as an outpatient and is therefore not taking any medications as an outpatient.    Hypokalemia  - Replace p.o.  - Recheck in a.m.    Polysubstance abuse  - Admits to using cocaine and heroin, however states that he quit for 5 days ago.  - He is not interested in discussing this any further or seeking any treatment.  - Urine drug screen pending.    DVT Prophylaxis: Low Risk/Ambulatory with no VTE prophylaxis indicated  Code Status: Full Code  Expected discharge: 1-2 days, recommended to prior living arrangement once COPD exacerbation improved.    Ethan Greene MD    Primary Care Physician   Physician No Ref-Primary    Chief Complaint   Difficulty breathing    History is obtained from the patient    History of Present Illness   Agustín Gomez is a 49 year old male with a history of COPD who presented to the ER today for evaluation of shortness of breath.  He states that he has been feeling short of breath for the last few weeks.  He was seen at [facility] about a week ago and improved somewhat and was discharged home.  He states he was prescribed some medications but was unable to afford them.  Over the past week, his shortness of breath is continued to get worse.  He has a cough that is productive of some  yellow phlegm.  Denies fevers and chills but has had some occasional sweats.  No significant chest pain.  Denies abdominal pain and diarrhea.  Has had some nausea.  No urinary symptoms.  Has some generalized aches and pains.  In ER, he was given nebulizers and IV Solu-Medrol.  He was also given a dose of magnesium sulfate, although he seemed to not tolerate this well.  He became anxious and was given a dose of lorazepam with some improvement.  Labs were fairly unremarkable other than mild hypokalemia.  Chest x-ray showed no acute cardiopulmonary disease.  He has been hemodynamically stable and is not hypoxic, however he remains quite short of breath and tachypneic.  It was felt that he should come in the hospital for observation.    Past Medical History    I have reviewed this patient's medical history and updated it with pertinent information if needed.   Past Medical History:   Diagnosis Date     COPD (chronic obstructive pulmonary disease) (H)      Past Surgical History   I have reviewed this patient's surgical history and updated it with pertinent information if needed.  History reviewed. No pertinent surgical history.    Prior to Admission Medications   Prior to Admission Medications   Prescriptions Last Dose Informant Patient Reported? Taking?   albuterol (PROAIR HFA/PROVENTIL HFA/VENTOLIN HFA) 108 (90 Base) MCG/ACT inhaler Past Week at Unknown time Self Yes Yes   Sig: Inhale 2 puffs into the lungs every 6 hours      Facility-Administered Medications: None     Allergies   No Known Allergies    Social History   I have reviewed this patient's social history and updated it with pertinent information if needed. Agustín Patricia  reports that he has been smoking. He has been smoking about 0.00 packs per day. He has never used smokeless tobacco. He reports current alcohol use. He reports current drug use. Drugs: IV, Opiates, and Cocaine.    Family History   I have reviewed this patient's family history and updated it  with pertinent information if needed.   History reviewed. No pertinent family history.    Review of Systems   The 10 point Review of Systems is negative other than noted in the HPI or here.    Physical Exam   Temp: 99.1  F (37.3  C) Temp src: Temporal BP: 139/78 Pulse: 83 Heart Rate: 86 Resp: 25 SpO2: 99 % O2 Device: None (Room air)    Vital Signs with Ranges  Temp:  [99.1  F (37.3  C)] 99.1  F (37.3  C)  Pulse:  [83-92] 83  Heart Rate:  [86-93] 86  Resp:  [20-44] 25  BP: (126-139)/() 139/78  SpO2:  [98 %-99 %] 99 %  0 lbs 0 oz    Constitutional: awake, alert, cooperative  Eyes: pupils equal, round and reactive to light  ENT: oral pharynx with moist mucous membranes, poor dentition  Respiratory: wheezes throughout, tachypneic  Cardiovascular: regular rate and rhythm, normal S1 and S2, and no murmur noted  GI: normal bowel sounds, soft, non-distended, mild left upper quadrant tenderness  Skin: warm, dry  Musculoskeletal: no lower extremity pitting edema present  Neurologic: awake, alert, oriented to name, place and time    Data   Data reviewed today:  I personally reviewed the chest x-ray image(s) showing no acute cardiopulmonary disease.    Recent Labs   Lab 10/11/19  1803 10/11/19  1730   WBC  --  10.1   HGB  --  15.9   MCV  --  88   PLT  --  268     --    POTASSIUM 3.1*  --    CHLORIDE 113*  --    CO2 22  --    BUN 9  --    CR 0.64*  --    ANIONGAP 6  --    JUAN FRANCISCO 7.4*  --    GLC 99  --    TROPI <0.015  --      Recent Results (from the past 24 hour(s))   XR Chest 2 Views    Narrative    XR CHEST TWO VIEWS   10/11/2019 5:48 PM     HISTORY: Shortness of breath, cough, chronic obstructive pulmonary  disease.      COMPARISON: None available      Impression    IMPRESSION: No acute cardiopulmonary disease. Hyperinflated lungs with  flattening of the hemidiaphragms in keeping with emphysema. Mild  subsegmental atelectasis in the left lower lung. Multilevel  degenerative changes seen in the spine.    NISSAR  MD DERECK

## 2019-10-12 NOTE — PHARMACY-ADMISSION MEDICATION HISTORY
Admission medication history interview status for the 10/11/2019  admission is complete. See EPIC admission navigator for prior to admission medications     Medication history source reliability:Good    Actions taken by pharmacist (provider contacted, etc):Talked to patient, Reviewed SureScirpts     Additional medication history information not noted on PTA med list :None    Medication reconciliation/reorder completed by provider prior to medication history? No    Time spent in this activity: 5 minutes     Prior to Admission medications    Medication Sig Last Dose Taking? Auth Provider   albuterol (PROAIR HFA/PROVENTIL HFA/VENTOLIN HFA) 108 (90 Base) MCG/ACT inhaler Inhale 2 puffs into the lungs every 6 hours Past Week at Unknown time Yes Unknown, Entered By History

## 2019-10-12 NOTE — CONSULTS
Care Transition Initial Assessment - RN      Met with: Patient to discuss discharge plan.  Per pt, prior to admit he was living independently at a friend's house with no services.  Per patient, he doesn't have a primary md.  Patient requested PCP appointment be scheduled in [place].  Offered [facility] and patient was in agreement with this plan.  After hospital stay appointment scheduled and placed on AVS.  Per pt, he will be able to walk to this appointment.     DATA   Active Problems:    COPD with acute exacerbation (H)       Cognitive Status: awake.        Contact information and PCP information verified: No, patient had no PCP prior to admit, scheduled pt to see PCP at [facility] on 10/22/19 at 1400 with Dr. Weathers                        Insurance concerns: patient has no insurance  ASSESSMENT  Patient currently receives the following services:  none        Identified issues/concerns regarding health management: none identified    PLAN  Financial costs for the patient include TBD .  Patient given options and choices for discharge yes .  Patient/family is agreeable to the plan?  Yes: home with follow-up PCP at  [facility]  Patient anticipates discharging to home.        Patient anticipates needs for home equipment: No  Transportation/person available to transport on day of discharge  is TBD.  Plan/Disposition: Home   Appointments: See [facility]      Care  (CTS) will continue to follow as needed.

## 2019-10-13 VITALS
BODY MASS INDEX: 22.35 KG/M2 | RESPIRATION RATE: 20 BRPM | TEMPERATURE: 98.1 F | HEART RATE: 83 BPM | SYSTOLIC BLOOD PRESSURE: 144 MMHG | WEIGHT: 174.16 LBS | DIASTOLIC BLOOD PRESSURE: 88 MMHG | HEIGHT: 74 IN | OXYGEN SATURATION: 96 %

## 2019-10-13 PROCEDURE — 96376 TX/PRO/DX INJ SAME DRUG ADON: CPT | Performed by: HOSPITALIST

## 2019-10-13 PROCEDURE — 25000128 H RX IP 250 OP 636: Performed by: HOSPITALIST

## 2019-10-13 PROCEDURE — G0378 HOSPITAL OBSERVATION PER HR: HCPCS

## 2019-10-13 PROCEDURE — 25000132 ZZH RX MED GY IP 250 OP 250 PS 637: Performed by: HOSPITALIST

## 2019-10-13 PROCEDURE — 25000125 ZZHC RX 250: Performed by: HOSPITALIST

## 2019-10-13 PROCEDURE — 99217 ZZC OBSERVATION CARE DISCHARGE: CPT | Performed by: HOSPITALIST

## 2019-10-13 PROCEDURE — 94640 AIRWAY INHALATION TREATMENT: CPT

## 2019-10-13 PROCEDURE — 25000131 ZZH RX MED GY IP 250 OP 636 PS 637: Performed by: HOSPITALIST

## 2019-10-13 PROCEDURE — 40000275 ZZH STATISTIC RCP TIME EA 10 MIN

## 2019-10-13 RX ORDER — ALBUTEROL SULFATE 90 UG/1
2 AEROSOL, METERED RESPIRATORY (INHALATION) EVERY 6 HOURS PRN
Qty: 1 INHALER | Refills: 0 | Status: SHIPPED | OUTPATIENT
Start: 2019-10-13

## 2019-10-13 RX ORDER — LORAZEPAM 2 MG/ML
1 INJECTION INTRAMUSCULAR ONCE
Status: COMPLETED | OUTPATIENT
Start: 2019-10-13 | End: 2019-10-13

## 2019-10-13 RX ORDER — PREDNISONE 20 MG/1
40 TABLET ORAL DAILY
Qty: 6 TABLET | Refills: 0 | Status: SHIPPED | OUTPATIENT
Start: 2019-10-14 | End: 2019-10-17

## 2019-10-13 RX ADMIN — PREDNISONE 40 MG: 20 TABLET ORAL at 08:40

## 2019-10-13 RX ADMIN — LORAZEPAM 1 MG: 2 INJECTION INTRAMUSCULAR; INTRAVENOUS at 08:55

## 2019-10-13 RX ADMIN — ACETAMINOPHEN 650 MG: 325 TABLET, FILM COATED ORAL at 08:40

## 2019-10-13 RX ADMIN — Medication 1 MG: at 00:02

## 2019-10-13 RX ADMIN — IPRATROPIUM BROMIDE AND ALBUTEROL SULFATE 3 ML: .5; 3 SOLUTION RESPIRATORY (INHALATION) at 07:18

## 2019-10-13 NOTE — PLAN OF CARE
Discharge    Patient discharged to home via [employer]  Care plan note:  DATE & TIME: 10/13/19 [id number]  Cognitive Concerns/ Orientation : A&O x4  BEHAVIOR & AGGRESSION TOOL COLOR: Green, anxious at times      ABNL VS/O2: VSS on RA  MOBILITY: Independent  PAIN MANAGMENT: C/O chronic lower leg pain, Tylenol given x1, helpful. Ibuprofen available.   DIET: Regular, tolerating   BOWEL/BLADDER: Continent, up to bathroom   ABNL LAB/BG: n/a  DRAIN/DEVICES: IV SL  SKIN:  Bruises, scabs/lesions to bilateral lower legs  TESTS/PROCEDURES:  n/a  D/C DAY/GOALS/PLACE:  To home today  OTHER IMPORTANT INFO:  LS diminished/exp wheezes, DIAS. Anxious this am after nebulizer treatment, PRN ativan x1 ordered and given, helpful.     Listed belongings gathered and returned to patient. Yes  Care Plan and Patient education resolved: Yes  Prescriptions if needed, hard copies sent with patient  NA  Home and hospital acquired medications returned to patient: NA  Medication Bin checked and emptied on discharge Yes  Follow up appointment made for patient: Yes

## 2019-10-13 NOTE — PROGRESS NOTES
Per Md, patient discharging home on 2 inhalers and prednisone and that he is unable to pay for his medications and has no insurance.  Met with pt who confirmed that he is unable to pay for the medications.  Discussed that PCP appointment made and that if he attends this appointment, he would be able to discuss this at his appointment and would be able to receive assistance with applying for MA. Patient stated that he will attend his PCP appointment.  Hand-off sent to clinic regarding this.  [facility] Care used for 2 inhalers and prednisone with total cost of $289.20.  [facility] Care Funding Request completed and notified Elina Mccall RN Manager of Inpatient Care Coordination & [facility] that [facility] care being used for patient.

## 2019-10-13 NOTE — PLAN OF CARE
"DATE & TIME: 10/12/19 [id number]  Cognitive Concerns/ Orientation : A&O x4  BEHAVIOR & AGGRESSION TOOL COLOR: Green, anxious at times             ABNL VS/O2: VSS on RA  MOBILITY: Independent  PAIN MANAGMENT: C/O chronic lower leg pain Ibuprofen given x1, Offered tylenol- pt refused said \"it doesn't work.\" One time IV Toradol given. Tried heat packs and soaking legs in bathtub- pt reports no change in pain. MD ordered one time IV dilaudid- given.  DIET: Regular  BOWEL/BLADDER: Continent, up to bathroom. BM x1 this shift  ABNL LAB/BG: K recheck 4.6; Urine positive for cocaine and opiates  DRAIN/DEVICES: IV SL  SKIN:  Bruises, scabs/lesions to bilateral lower legs  TESTS/PROCEDURES:  n/a  D/C DAY/GOALS/PLACE:  Pending, likely home tomorrow   OTHER IMPORTANT INFO:  LS diminished/exp wheezes, DIAS.      "

## 2019-10-13 NOTE — PROGRESS NOTES
Nebulizer treatments given as ordered. BS are diminished before with better aeration after with faint expiratory wheezes. Patient in on room air.

## 2019-10-13 NOTE — DISCHARGE SUMMARY
[facility]  Hospitalist Discharge Summary       Date of Admission:  10/11/2019  Date of Discharge:  10/13/2019  Discharging Provider: Ke Pedersen MD      Discharge Diagnoses   Acute COPD exacerbation  Chronic bilateral lower extremity pain  Hypokalemia  Polysubstance abuse      Follow-ups Needed After Discharge   Follow-up Appointments     Follow-up and recommended labs and tests       Follow up with primary care provider, as scheduled.           Discharge Disposition   Discharged to home  Condition at discharge: Stable    Hospital Course   Agustín Gomez is a 49 year old male with a history of COPD who presented to the ER today for evaluation of shortness of breath.  He was felt to have a COPD exacerbation and is being brought into the hospital for observation.     Acute COPD exacerbation  Likely due to cocaine abuse.  Improved with steroid burst and scheduled neb treatments.  Will discharge on prednisone to complete 5-day burst, prescribed Anoro inhaler and renewed ventolin script at discharge.  Appointment made to establish with PCP in about 1 week where clinic social work could provide financial assistance, unclear if he is a candidate for MA.    Chronic bilateral lower extremity pain  Etiology unclear, may consider neuropathy but description is somewhat atypical - have some concern for drug seeking as requested IV dilaudid at admission and during hospital stay.  Reports chronic pain in bilateral lower extremities from the knee down, encompassing entire lower extremity.  CK wnl, no signs of cellulitis, no edema to suggest DVT and unlikely given symmetric pain.  No signs of malperfusion.  Recommend tylenol and ibuprofen as needed, follow up with PCP.     Hypokalemia  Resolved with replacement.     Polysubstance abuse  Admits to using cocaine and heroin, however states that he quit for 5 days ago (Utox positive for cocaine and opiates).  He is not interested in discussing this any further or  seeking any treatment.  Discussed importance of abstinence, especially of cocaine use in setting of COPD.        Consultations This Hospital Stay   CARE COORDINATOR IP CONSULT  RESPIRATORY CARE IP CONSULT  SOCIAL WORK IP CONSULT    Code Status   Full Code    Time Spent on this Encounter   I, Ke Pedersen MD, personally saw the patient today and spent less than or equal to 30 minutes discharging this patient.       Ke Pedersen MD  [facility]  ______________________________________________________________________    Physical Exam   Vital Signs: Temp: 98.1  F (36.7  C) Temp src: Oral BP: (!) 144/88   Heart Rate: 103 Resp: 20 SpO2: 96 % O2 Device: None (Room air)    Weight: 174 lbs 2.61 oz  General Appearance: Well developed, well nourished male in NAD  Respiratory: mild expiratory wheezing bilaterally, no crackles or tachypnea  Cardiovascular: RRR, normal s1/s2 without murmur  GI: abdomen soft, normal bowel sounds  Other: Alert and appropriate, cranial nerves grossly intact        Primary Care Physician   Jonny Weathers    Discharge Orders      Reason for your hospital stay    You were admitted for shortness of breath due to COPD exacerbation.     Follow-up and recommended labs and tests     Follow up with primary care provider, as scheduled.     Activity    Your activity upon discharge: activity as tolerated     Discharge Instructions    Abstain from all cocaine and opiate use.     Full Code     Diet    Follow this diet upon discharge:       Regular Diet Adult       Significant Results and Procedures   Most Recent 3 CBC's:  Recent Labs   Lab Test 10/11/19  1730   WBC 10.1   HGB 15.9   MCV 88        Most Recent 3 BMP's:  Recent Labs   Lab Test 10/12/19  1301 10/11/19  1803    141   POTASSIUM 4.6 3.1*   CHLORIDE 108 113*   CO2 24 22   BUN 15 9   CR 0.72 0.64*   ANIONGAP 4 6   JUAN FRANCISCO 9.2 7.4*   * 99   ,   Results for orders placed or performed during the hospital encounter  of 10/11/19   XR Chest 2 Views    Narrative    XR CHEST TWO VIEWS   10/11/2019 5:48 PM     HISTORY: Shortness of breath, cough, chronic obstructive pulmonary  disease.      COMPARISON: None available      Impression    IMPRESSION: No acute cardiopulmonary disease. Hyperinflated lungs with  flattening of the hemidiaphragms in keeping with emphysema. Mild  subsegmental atelectasis in the left lower lung. Multilevel  degenerative changes seen in the spine.    KARINA HARDEN MD       Discharge Medications   Current Discharge Medication List      START taking these medications    Details   predniSONE (DELTASONE) 20 MG tablet Take 2 tablets (40 mg) by mouth daily for 3 days  Qty: 6 tablet, Refills: 0    Associated Diagnoses: COPD with acute exacerbation (H)      umeclidinium-vilanterol (ANORO ELLIPTA) 62.5-25 MCG/INH oral inhaler Inhale 1 puff into the lungs daily  Qty: 1 Inhaler, Refills: 0    Comments: Future refills by PCP Dr. Jonny Weathers with phone number [phone].  Associated Diagnoses: COPD with acute exacerbation (H)         CONTINUE these medications which have CHANGED    Details   albuterol (PROAIR HFA/PROVENTIL HFA/VENTOLIN HFA) 108 (90 Base) MCG/ACT inhaler Inhale 2 puffs into the lungs every 6 hours as needed for shortness of breath / dyspnea or wheezing  Qty: 1 Inhaler, Refills: 0    Comments: Pharmacy may dispense brand covered by insurance (Proair, or proventil or ventolin or generic albuterol inhaler) Future refills by PCP Dr. Jonny Weathers with phone number [phone].  Associated Diagnoses: COPD with acute exacerbation (H)           Allergies   No Known Allergies

## 2019-10-13 NOTE — DISCHARGE INSTRUCTIONS
Patient has an inhaler in the cubby. Please send with patient at discharge.     A follow-up appointment was scheduled for you [see above].  It is very important to go to it--bring these papers, an ID, and your insurance card with you.  At the visit, talk about your hospital stay.  Tell your doctor how you feel.  Show your new list of medications.  Your doctor will update records, make sure you are still doing OK, and decide if any tests or medication changes are needed.

## 2019-10-13 NOTE — PROGRESS NOTES
MD Notification    Notified Person: MD    Notified Person Name: Jc    Notification Date/Time: 1915    Notification Interaction: page    Purpose of Notification: Pt having leg pain- Tylenol and Ibuprofen not working    Orders Received: One time IV Toradol     Comments:

## 2019-10-13 NOTE — PLAN OF CARE
DATE & TIME: 10/12 Night                           Cognitive Concerns/ Orientation : Alert and oriented   BEHAVIOR & AGGRESSION TOOL COLOR: Green     ABNL VS/O2: VSS on room air  MOBILITY: Independent  PAIN MANAGMENT: Nothing given this shift  DIET: Regular  BOWEL/BLADDER: Continent  DRAIN/DEVICES: IV SL  SKIN:    D/C DAY/GOALS/PLACE: Likely today  OTHER IMPORTANT INFO:  Lung sounds diminished with expiratory wheezes auscultated throughout. Patient reports SOB and DIAS

## 2019-10-14 ENCOUNTER — PATIENT OUTREACH (OUTPATIENT)
Dept: CARE COORDINATION | Facility: CLINIC | Age: 49
End: 2019-10-14

## 2019-10-14 DIAGNOSIS — J44.1 COPD WITH ACUTE EXACERBATION (H): Primary | ICD-10-CM

## 2019-10-14 ASSESSMENT — ACTIVITIES OF DAILY LIVING (ADL): DEPENDENT_IADLS:: INDEPENDENT

## 2019-10-14 NOTE — LETTER
[facility] CARE COORDINATION    October 15, 2019    Agustín Gomez  [address]   [place] [place] [address]      Dear Agustín,    I am a clinic care coordinator who works with Jonny Weathers MD at [facility]. I wanted to introduce myself and provide you with my contact information so that you can call me with questions or concerns about your health care. Below is a description of clinic care coordination and how I can further assist you.     The clinic care coordinator is a registered nurse and/or  who understand the health care system. The goal of clinic care coordination is to help you manage your health and improve access to the [facility] in the most efficient manner. The registered nurse can assist you in meeting your health care goals by providing education, coordinating services, and strengthening the communication among your providers. The  can assist you with financial, behavioral, psychosocial, chemical dependency, counseling, and/or psychiatric resources.    Please feel free to contact me at [phone], with any questions or concerns. We at [facility] are focused on providing you with the highest-quality healthcare experience possible and that all starts with you.     Sincerely,     CODIE Patel

## 2020-07-20 ENCOUNTER — TRANSFERRED RECORDS (OUTPATIENT)
Dept: HEALTH INFORMATION MANAGEMENT | Facility: CLINIC | Age: 50
End: 2020-07-20

## 2020-07-20 ENCOUNTER — MEDICAL CORRESPONDENCE (OUTPATIENT)
Dept: HEALTH INFORMATION MANAGEMENT | Facility: CLINIC | Age: 50
End: 2020-07-20

## 2020-07-30 ENCOUNTER — TRANSFERRED RECORDS (OUTPATIENT)
Dept: HEALTH INFORMATION MANAGEMENT | Facility: CLINIC | Age: 50
End: 2020-07-30

## 2020-08-03 ENCOUNTER — REFERRAL (OUTPATIENT)
Dept: TRANSPLANT | Facility: CLINIC | Age: 50
End: 2020-08-03

## 2020-08-03 DIAGNOSIS — J44.9 COPD (CHRONIC OBSTRUCTIVE PULMONARY DISEASE) (H): Primary | ICD-10-CM

## 2020-08-03 NOTE — LETTER
August 18, 2020        Agustín Gomez  [address]  [place] [address]      Dear Agustín,    Thank you for your interest in the Transplant Center at [facility]. We look forward to being a part of your care team and assisting you through the transplant process.    As we discussed, your transplant coordinator is Amy Turner (Lung).  You may call your coordinator at any time with questions or concerns.  Your first scheduled call will be on August 27, 2020 between 10:00 am and 12:00 pm.  If this needs to change, call [phone].    Please complete the following.    1. Fill out and return the enclosed forms    Authorization for Electronic Communication    Authorization to Discuss Protected Health Information    Authorization for Release of Protected Health Information    2. Sign up for:    [employer], access to your electronic medical record (see enclosed pamphlet)    [employer], a transplant education website    You can use these tools to learn more about your transplant, communicate with your care team, and track your medical details    Sincerely,    Solid Organ Transplant  [facility]    Cc: Yue Salinas NP(Referring), Ke Bautista MD(PCP)

## 2020-08-18 VITALS — BODY MASS INDEX: 23.1 KG/M2 | HEIGHT: 74 IN | WEIGHT: 180 LBS

## 2020-08-18 ASSESSMENT — MIFFLIN-ST. JEOR: SCORE: 1746.22

## 2020-08-18 NOTE — TELEPHONE ENCOUNTER
SOT LUNG INTAKE    August 18, 2020    Referring Provider: Yue Salinas CNP-[facility]  Primary Provider: Ke Bautista MD- [facility]  Specialist: no  Diagnosis:COPD  Source/Facility: [facility]    Critical History:  Smoking/nicotine use history: quit smoking cigarettes about 3 weeks ago, smoked since age 11,  1-4 packs daily for 38 yrs. Currently using nicotine lozenges  Alcohol use history: not currently  Drug use history: past opiates,cocaine, and marijuana- patient currently on Methadone, smokes marijuana on occasion  Cancer history:  no  Cardiac history:  no  BMI:23.1  O2 use @ 2 LPM most of the time      Notes:     Is patient in a group home/assisted living? Yes, [facility]   Does patient have a guardian? no    Referral intake process completed.  Patient is aware that after financial approval is received, medical records will be requested.   Patient confirmed for a callback from transplant coordinator on August 27, 2020. (within 2 weeks)  Tentative evaluation date TBD. (within 4 weeks)    Confirmed coordinator will discuss evaluation process in more detail at the time of their call.   Patient is aware of the need to arrange age appropriate cancer screening, vaccinations, and dental care.  Reminded patient to complete questionnaire, complete medical records release, and review packet prior to evaluation visit .  Assessed patient for special needs (ie--wheelchair, assistance, guardian, and ):  none   Patient instructed to call [phone] with questions.

## 2020-09-15 NOTE — TELEPHONE ENCOUNTER
Follow up call as unable to connect for initial scheduled coordinator call. Agustín stated he wanted to wait to pursue transplant as he does not feel like his breathing is bad enough. I did educate on the importance and benefits of early referral. Also discussed ongoing smoking and reviewed requirements of both smoking and nicotine free (lozenge, sprays, patch, gum, avping, etc) for 6 months prior to evaluation/listing.    Lastly, reviewed insurance obstacles at this point and provided Trevor Mccloud's contact information for future references and to answer any coverage related questions.    Will close referral at this time per patient choice as well as ongoing substance use and lack of insurance clearance.    Encouraged to call back with any questions/concerns or reconsiderations.